# Patient Record
Sex: MALE | Race: WHITE | NOT HISPANIC OR LATINO
[De-identification: names, ages, dates, MRNs, and addresses within clinical notes are randomized per-mention and may not be internally consistent; named-entity substitution may affect disease eponyms.]

---

## 2022-03-23 PROBLEM — Z00.00 ENCOUNTER FOR PREVENTIVE HEALTH EXAMINATION: Status: ACTIVE | Noted: 2022-03-23

## 2022-07-12 ENCOUNTER — APPOINTMENT (OUTPATIENT)
Dept: NEPHROLOGY | Facility: CLINIC | Age: 72
End: 2022-07-12

## 2022-07-21 ENCOUNTER — APPOINTMENT (OUTPATIENT)
Dept: NEPHROLOGY | Facility: CLINIC | Age: 72
End: 2022-07-21

## 2022-07-21 VITALS
TEMPERATURE: 97.8 F | BODY MASS INDEX: 26.08 KG/M2 | HEART RATE: 68 BPM | SYSTOLIC BLOOD PRESSURE: 122 MMHG | DIASTOLIC BLOOD PRESSURE: 68 MMHG | OXYGEN SATURATION: 97 % | HEIGHT: 70 IN | WEIGHT: 182.2 LBS | RESPIRATION RATE: 18 BRPM

## 2022-07-21 DIAGNOSIS — N18.9 CHRONIC KIDNEY DISEASE, UNSPECIFIED: ICD-10-CM

## 2022-07-21 DIAGNOSIS — D63.1 CHRONIC KIDNEY DISEASE, UNSPECIFIED: ICD-10-CM

## 2022-07-21 DIAGNOSIS — H40.9 UNSPECIFIED GLAUCOMA: ICD-10-CM

## 2022-07-21 DIAGNOSIS — R09.89 OTHER SPECIFIED SYMPTOMS AND SIGNS INVOLVING THE CIRCULATORY AND RESPIRATORY SYSTEMS: ICD-10-CM

## 2022-07-21 DIAGNOSIS — E78.5 HYPERLIPIDEMIA, UNSPECIFIED: ICD-10-CM

## 2022-07-21 DIAGNOSIS — Z87.891 PERSONAL HISTORY OF NICOTINE DEPENDENCE: ICD-10-CM

## 2022-07-21 DIAGNOSIS — N25.81 SECONDARY HYPERPARATHYROIDISM OF RENAL ORIGIN: ICD-10-CM

## 2022-07-21 DIAGNOSIS — Z80.7 FAMILY HISTORY OF OTHER MALIGNANT NEOPLASMS OF LYMPHOID, HEMATOPOIETIC AND RELATED TISSUES: ICD-10-CM

## 2022-07-21 DIAGNOSIS — Z82.49 FAMILY HISTORY OF ISCHEMIC HEART DISEASE AND OTHER DISEASES OF THE CIRCULATORY SYSTEM: ICD-10-CM

## 2022-07-21 DIAGNOSIS — H35.30 UNSPECIFIED MACULAR DEGENERATION: ICD-10-CM

## 2022-07-21 DIAGNOSIS — I77.819 AORTIC ECTASIA, UNSPECIFIED SITE: ICD-10-CM

## 2022-07-21 DIAGNOSIS — I42.9 CARDIOMYOPATHY, UNSPECIFIED: ICD-10-CM

## 2022-07-21 DIAGNOSIS — K21.9 GASTRO-ESOPHAGEAL REFLUX DISEASE W/OUT ESOPHAGITIS: ICD-10-CM

## 2022-07-21 DIAGNOSIS — J45.909 UNSPECIFIED ASTHMA, UNCOMPLICATED: ICD-10-CM

## 2022-07-21 DIAGNOSIS — I20.9 ANGINA PECTORIS, UNSPECIFIED: ICD-10-CM

## 2022-07-21 DIAGNOSIS — Z85.72 PERSONAL HISTORY OF NON-HODGKIN LYMPHOMAS: ICD-10-CM

## 2022-07-21 DIAGNOSIS — Z80.0 FAMILY HISTORY OF MALIGNANT NEOPLASM OF DIGESTIVE ORGANS: ICD-10-CM

## 2022-07-21 DIAGNOSIS — Z87.438 PERSONAL HISTORY OF OTHER DISEASES OF MALE GENITAL ORGANS: ICD-10-CM

## 2022-07-21 DIAGNOSIS — M10.9 GOUT, UNSPECIFIED: ICD-10-CM

## 2022-07-21 DIAGNOSIS — G43.909 MIGRAINE, UNSPECIFIED, NOT INTRACTABLE, W/OUT STATUS MIGRAINOSUS: ICD-10-CM

## 2022-07-21 DIAGNOSIS — J44.9 CHRONIC OBSTRUCTIVE PULMONARY DISEASE, UNSPECIFIED: ICD-10-CM

## 2022-07-21 PROCEDURE — 99214 OFFICE O/P EST MOD 30 MIN: CPT

## 2022-07-21 RX ORDER — MONTELUKAST 10 MG/1
10 TABLET, FILM COATED ORAL DAILY
Refills: 0 | Status: ACTIVE | COMMUNITY
Start: 2022-05-19

## 2022-07-21 RX ORDER — UREA 40 G/100G
40 CREAM TOPICAL TWICE DAILY
Refills: 0 | Status: ACTIVE | COMMUNITY

## 2022-07-21 RX ORDER — ALCLOMETASONE DIPROPIONATE 0.5 MG/G
0.05 CREAM TOPICAL TWICE DAILY
Refills: 0 | Status: ACTIVE | COMMUNITY

## 2022-07-21 RX ORDER — PANTOPRAZOLE 40 MG/1
40 TABLET, DELAYED RELEASE ORAL DAILY
Refills: 0 | Status: ACTIVE | COMMUNITY
Start: 2022-06-02

## 2022-07-21 RX ORDER — LOSARTAN POTASSIUM 25 MG/1
25 TABLET, FILM COATED ORAL DAILY
Refills: 0 | Status: ACTIVE | COMMUNITY

## 2022-07-21 RX ORDER — BIMATOPROST 0.1 MG/ML
0.01 SOLUTION/ DROPS OPHTHALMIC DAILY
Refills: 0 | Status: ACTIVE | COMMUNITY
Start: 2022-06-06

## 2022-07-21 RX ORDER — FAMOTIDINE 20 MG/1
20 TABLET, FILM COATED ORAL TWICE DAILY
Refills: 0 | Status: ACTIVE | COMMUNITY
Start: 2022-04-17

## 2022-07-21 RX ORDER — UBIDECARENONE/VIT E ACET 100MG-5
50 MCG CAPSULE ORAL
Refills: 0 | Status: ACTIVE | COMMUNITY

## 2022-07-21 RX ORDER — LEVALBUTEROL TARTRATE 45 UG/1
45 AEROSOL, METERED ORAL EVERY 6 HOURS
Refills: 0 | Status: ACTIVE | COMMUNITY

## 2022-07-21 RX ORDER — LORATADINE 10 MG/1
10 TABLET ORAL
Refills: 0 | Status: ACTIVE | COMMUNITY

## 2022-07-21 RX ORDER — LOTEPREDNOL ETABONATE AND TOBRAMYCIN 5; 3 MG/ML; MG/ML
0.5-0.3 SUSPENSION/ DROPS OPHTHALMIC
Refills: 0 | Status: ACTIVE | COMMUNITY

## 2022-07-21 RX ORDER — ALLOPURINOL 100 MG/1
100 TABLET ORAL DAILY
Refills: 0 | Status: ACTIVE | COMMUNITY
Start: 2022-06-27

## 2022-07-21 RX ORDER — CARVEDILOL 12.5 MG/1
12.5 TABLET, FILM COATED ORAL TWICE DAILY
Refills: 0 | Status: ACTIVE | COMMUNITY
Start: 2022-05-19

## 2022-07-21 RX ORDER — VIT A/VIT C/VIT E/ZINC/COPPER 4296-226
CAPSULE ORAL
Refills: 0 | Status: ACTIVE | COMMUNITY

## 2022-07-21 RX ORDER — ASPIRIN 81 MG/1
81 TABLET, CHEWABLE ORAL DAILY
Refills: 0 | Status: ACTIVE | COMMUNITY

## 2022-07-21 RX ORDER — AMLODIPINE BESYLATE 2.5 MG/1
2.5 TABLET ORAL DAILY
Refills: 0 | Status: ACTIVE | COMMUNITY

## 2022-07-21 RX ORDER — ATORVASTATIN CALCIUM 20 MG/1
20 TABLET, FILM COATED ORAL DAILY
Refills: 0 | Status: ACTIVE | COMMUNITY
Start: 2022-05-19

## 2022-07-21 NOTE — ASSESSMENT
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  He has a history of left hydronephrosis due to a ureteral 'kink' which was stented and then repaired by Dr. Warren Bromberg (2018).  \par \par Dr. Hudson has has episodes of orthostatic hypotension in the past which appear to have improved followed a lowering of the amlodipine dose.\par \par RENAL ULTRASOUND (06/10/2018) R. 10.1 cm, L. 14.2 cm.  Right kidney is echogenic with cortical thinning and multiple cysts. Persistent severe left sided hydronephrosis.\par \par RENAL ULTRASOUND (12/05/2018) Dysplastic and multicystic right kidney, moderate left hydronephrosis- both stable compared with CT scan from August 2013\par \par \par (1) Stage III CKD.  The BUN/creatinine trend is as follows:  29/1.7 (03/2021), 26/1.5 (12/02/2021), 34/1.5 (03/10/2022).  The urinalysis of 06/08/2022  demonstrated trace protein and no blood.  The last urine protein to creatinine ratio was 235 mg per gram and urine albumin to creatinine ratio of 84.9 mcg per gram.  This  is a non proteinuric renal disease.  Dr. Reyes is suspicious for a contribution from the prior obstruction (chronic changes in one kidney, hydronephrosis in the other) and a possible cardiorenal component.  Dr. Hudson reports having an EF of around 50%.  Common contributors to non proteinuric renal disease include hypertension and atherosclerotic disease.   I suspect the losartan is a contributor.\par \par -Repeat the lab studies below prior to follow up in 3 months\par \par (2) Hypertension.  Excellent control.\par \par -Continue current regimen\par \par (3) Hyperkalemia\par \par -Try to get high K+ foods out of the diet (potatoes, tomatoes, avocados, melons, oranges, bananas)\par -I explained how to cook low potassium potatoes\par

## 2022-07-21 NOTE — PHYSICAL EXAM
[General Appearance - Alert] : alert [General Appearance - In No Acute Distress] : in no acute distress [Sclera] : the sclera and conjunctiva were normal [Extraocular Movements] : extraocular movements were intact [Neck Appearance] : the appearance of the neck was normal [Respiration, Rhythm And Depth] : normal respiratory rhythm and effort [Exaggerated Use Of Accessory Muscles For Inspiration] : no accessory muscle use [Auscultation Breath Sounds / Voice Sounds] : lungs were clear to auscultation bilaterally [Heart Rate And Rhythm] : heart rate was normal and rhythm regular [Heart Sounds] : normal S1 and S2 [Heart Sounds Gallop] : no gallops [Murmurs] : no murmurs [Heart Sounds Pericardial Friction Rub] : no pericardial rub [Full Pulse] : the pedal pulses are present [Edema] : there was no peripheral edema [FreeTextEntry1] : bilateral radial pulses intact [Bowel Sounds] : normal bowel sounds [Abdomen Soft] : soft [Abdomen Tenderness] : non-tender [Abnormal Walk] : normal gait [Nail Clubbing] : no clubbing  or cyanosis of the fingernails [Involuntary Movements] : no involuntary movements were seen [Skin Color & Pigmentation] : normal skin color and pigmentation [] : no rash [No Focal Deficits] : no focal deficits [Oriented To Time, Place, And Person] : oriented to person, place, and time [Impaired Insight] : insight and judgment were intact [Affect] : the affect was normal [Mood] : the mood was normal

## 2022-07-21 NOTE — REVIEW OF SYSTEMS
[Feeling Tired] : feeling tired [Loss Of Hearing] : hearing loss [Hesitancy] : no urinary hesitancy [Negative] : Heme/Lymph [FreeTextEntry2] : Lost 25 pounds (since start of pandemic) on Weight Watcher's, recently regained 5 pounds [FreeTextEntry3] : macular degeneration, normal pressure glaucoma [FreeTextEntry8] : nocturia x 1-2, good urinary stream, appears "a little foamy" [de-identified] : balancing issues- went to balance therapy, has grab bars in the shower, "If I turn fast.....tendency to almost fall back", fell once in 2021- due to orthostatic hypotension.  Also has had vertigo in the past.

## 2022-07-21 NOTE — HISTORY OF PRESENT ILLNESS
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  His past medical history is significant for labile hypertension, hyperlipidemia, COPD (ex tobacco use), cardiac ablations for SVT +/- atrial fibrillation (most recently in 2007), CAD s/p MI (2017) without any intervention, and 'mild' anginal symptoms and a remote history of B-cell lymphoma s/p orchiectomy and chemotherapy (2008).  He has a history of left hydronephrosis due to a ureteral 'kink' which was stented and then repaired by Dr. Warren Bromberg (2018). He reportedly has stage III chronic kidney disease with albuminuria.  The rest of his medical history is outlined below. Mr. Hudson was followed by Dr. Jr Reyes until he left the area.  \par \par Dr. Hudson reports he is well. He feels "a little worn down since the pandemic".  He has not been walking much since the pandemic. 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

## 2022-07-21 NOTE — CONSULT LETTER
[Dear  ___] : Dear  [unfilled], [Consult Letter:] : I had the pleasure of evaluating your patient, [unfilled]. [Please see my note below.] : Please see my note below. [Consult Closing:] : Thank you very much for allowing me to participate in the care of this patient.  If you have any questions, please do not hesitate to contact me. [Sincerely,] : Sincerely, [FreeTextEntry3] : Cornel [DrGena  ___] : Dr. DAVIS [DrGena ___] : Dr. DAVIS

## 2023-01-17 ENCOUNTER — APPOINTMENT (OUTPATIENT)
Dept: NEPHROLOGY | Facility: CLINIC | Age: 73
End: 2023-01-17
Payer: COMMERCIAL

## 2023-01-17 VITALS
SYSTOLIC BLOOD PRESSURE: 124 MMHG | HEART RATE: 76 BPM | RESPIRATION RATE: 18 BRPM | TEMPERATURE: 97.6 F | DIASTOLIC BLOOD PRESSURE: 68 MMHG | WEIGHT: 187 LBS | HEIGHT: 70 IN | OXYGEN SATURATION: 94 % | BODY MASS INDEX: 26.77 KG/M2

## 2023-01-17 PROCEDURE — 99214 OFFICE O/P EST MOD 30 MIN: CPT

## 2023-01-17 RX ORDER — UBROGEPANT 100 MG/1
100 TABLET ORAL
Qty: 10 | Refills: 0 | Status: ACTIVE | COMMUNITY
Start: 2022-11-15

## 2023-01-17 RX ORDER — AZELASTINE HYDROCHLORIDE 137 UG/1
137 SPRAY, METERED NASAL TWICE DAILY
Refills: 0 | Status: DISCONTINUED | COMMUNITY
End: 2023-01-17

## 2023-01-17 RX ORDER — FLUTICASONE FUROATE, UMECLIDINIUM BROMIDE AND VILANTEROL TRIFENATATE 200; 62.5; 25 UG/1; UG/1; UG/1
200-62.5-25 POWDER RESPIRATORY (INHALATION)
Qty: 180 | Refills: 0 | Status: ACTIVE | COMMUNITY
Start: 2023-01-11

## 2023-01-17 RX ORDER — FLUTICASONE PROPIONATE AND SALMETEROL XINAFOATE 115; 21 UG/1; UG/1
115-21 AEROSOL, METERED RESPIRATORY (INHALATION)
Refills: 0 | Status: DISCONTINUED | COMMUNITY
Start: 2022-07-07 | End: 2023-01-17

## 2023-01-17 NOTE — CONSULT LETTER
[Dear  ___] : Dear  [unfilled], [Consult Letter:] : I had the pleasure of evaluating your patient, [unfilled]. [Please see my note below.] : Please see my note below. [Consult Closing:] : Thank you very much for allowing me to participate in the care of this patient.  If you have any questions, please do not hesitate to contact me. [Sincerely,] : Sincerely, [DrGena  ___] : Dr. DAVIS [DrGena ___] : Dr. DAVIS [FreeTextEntry3] : Cornel

## 2023-01-17 NOTE — ASSESSMENT
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  He has a history of left hydronephrosis due to a ureteral 'kink' which was stented and then repaired by Dr. Warren Bromberg (2018).  \par \par Dr. Hudson has has episodes of orthostatic hypotension in the past which appear to have improved followed a lowering of the amlodipine dose.\par \par RENAL ULTRASOUND (06/10/2018) R. 10.1 cm, L. 14.2 cm.  Right kidney is echogenic with cortical thinning and multiple cysts. Persistent severe left sided hydronephrosis.\par \par RENAL ULTRASOUND (12/05/2018) Dysplastic and multicystic right kidney, moderate left hydronephrosis- both stable compared with CT scan from August 2013\par \par \par (1) Stage III CKD.  The BUN/creatinine trend is as follows:  29/1.7 (03/2021), 26/1.5 (12/02/2021), 34/1.5 (03/10/2022), 30/1.4 (12/13/2022).  The urinalysis of 12/13/2022  demonstrated neither protein nor blood.  The recent albumin/creatinine ratio was 183.3 mg per gram.  The prior  urine protein to creatinine ratio was 235 mg per gram with a urine albumin to creatinine ratio of 84.9 mcg per gram.  This  is a non proteinuric renal disease.  Dr. Reyes was suspicious for a contribution from the prior obstruction (chronic changes in one kidney, hydronephrosis in the other) and a possible cardiorenal component.  Dr. Hudson reports having an EF of around 50%.  Common contributors to non proteinuric renal disease include hypertension and atherosclerotic disease.   I suspect the losartan is a contributor to the mildly elevated serum creatinine. \par \par (2) Hypertension.  Excellent control.\par \par -Continue current regimen\par \par (3) Hyperkalemia. Resolved.  The recent serum K+ was 5.1 meq per liter on a low K+ diet. \par \par (4) New diagnosis of DM (HbA1c 6.65)\par \par RECOMMENDATIONS:\par \par (1) Continue losartan for renal protection.  Ideally I would increase the dose to 50 mg daily though hesitate to given the prior hyperkalemia.  Continue the low K+ diet.  If the serum K+ remains normal, consider a trial of losartan 50 mg daily.\par \par (2) DM. We talked about cutting back on simple carbohydrates and weight loss. \par \par If all is stable, I will see Dr. Hudson back in 1 year with a CMP, urinalysis, random urine albumin/creatinine ratio. \par

## 2023-01-17 NOTE — PHYSICAL EXAM
[General Appearance - Alert] : alert [General Appearance - In No Acute Distress] : in no acute distress [Sclera] : the sclera and conjunctiva were normal [Extraocular Movements] : extraocular movements were intact [Neck Appearance] : the appearance of the neck was normal [Respiration, Rhythm And Depth] : normal respiratory rhythm and effort [Exaggerated Use Of Accessory Muscles For Inspiration] : no accessory muscle use [Auscultation Breath Sounds / Voice Sounds] : lungs were clear to auscultation bilaterally [Heart Rate And Rhythm] : heart rate was normal and rhythm regular [Heart Sounds] : normal S1 and S2 [Heart Sounds Gallop] : no gallops [Murmurs] : no murmurs [Heart Sounds Pericardial Friction Rub] : no pericardial rub [Full Pulse] : the pedal pulses are present [Edema] : there was no peripheral edema [Bowel Sounds] : normal bowel sounds [Abdomen Soft] : soft [Abdomen Tenderness] : non-tender [Abnormal Walk] : normal gait [Involuntary Movements] : no involuntary movements were seen [Skin Color & Pigmentation] : normal skin color and pigmentation [] : no rash [No Focal Deficits] : no focal deficits [Oriented To Time, Place, And Person] : oriented to person, place, and time [Impaired Insight] : insight and judgment were intact [Affect] : the affect was normal [Mood] : the mood was normal [FreeTextEntry1] : bilateral radial pulses intact

## 2023-01-17 NOTE — REVIEW OF SYSTEMS
[Feeling Tired] : feeling tired [Loss Of Hearing] : hearing loss [Negative] : Heme/Lymph [Hesitancy] : no urinary hesitancy [FreeTextEntry2] : Lost around 20 pounds on Weight Watcher's and gained 6 pounds over the past 6 months [FreeTextEntry3] : macular degeneration, normal pressure glaucoma [FreeTextEntry8] : nocturia x 0-2, good urinary stream, appears "a little foamy" [de-identified] : balancing issues- went to balance therapy, no grab bars in the shower, "If I turn fast.....tendency to almost fall back"- this "seems less".  Fell once in 2021- due to orthostatic hypotension.  Also has had vertigo in the past.  None recently

## 2023-01-17 NOTE — HISTORY OF PRESENT ILLNESS
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  His past medical history is significant for labile hypertension, hyperlipidemia, COPD (ex tobacco use), cardiac ablations for SVT +/- atrial fibrillation (most recently in 2007), CAD s/p MI (2017) without any intervention, and 'mild' anginal symptoms and a remote history of B-cell lymphoma s/p orchiectomy and chemotherapy (2008).  He has a history of left hydronephrosis due to a ureteral 'kink' which was stented and then repaired by Dr. Warren Bromberg (2018). He reportedly has stage III chronic kidney disease with albuminuria.  The rest of his medical history is outlined below. Mr. Hudson was followed by Dr. Jr Reyes until he left the area.  \par \par Dr. Hudson is here for follow up.  He had a "long respiratory illness" from September to December 2022 complicated by a COPD exacerbation.  He is currently feeling well.  He had an MRI of his brain reported as 'unremarkable'.  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

## 2024-01-22 ENCOUNTER — APPOINTMENT (OUTPATIENT)
Dept: NEPHROLOGY | Facility: CLINIC | Age: 74
End: 2024-01-22
Payer: COMMERCIAL

## 2024-01-22 VITALS
WEIGHT: 177 LBS | HEART RATE: 60 BPM | RESPIRATION RATE: 18 BRPM | SYSTOLIC BLOOD PRESSURE: 118 MMHG | BODY MASS INDEX: 25.34 KG/M2 | OXYGEN SATURATION: 94 % | TEMPERATURE: 97.7 F | DIASTOLIC BLOOD PRESSURE: 70 MMHG | HEIGHT: 70 IN

## 2024-01-22 DIAGNOSIS — N18.30 CHRONIC KIDNEY DISEASE, STAGE 3 UNSPECIFIED: ICD-10-CM

## 2024-01-22 DIAGNOSIS — R80.9 PROTEINURIA, UNSPECIFIED: ICD-10-CM

## 2024-01-22 PROCEDURE — 99214 OFFICE O/P EST MOD 30 MIN: CPT

## 2024-01-22 RX ORDER — AFLIBERCEPT 40 MG/ML
2 INJECTION, SOLUTION INTRAVITREAL
Refills: 0 | Status: DISCONTINUED | COMMUNITY
End: 2024-01-22

## 2024-01-22 RX ORDER — SERTRALINE HYDROCHLORIDE 100 MG/1
100 TABLET, FILM COATED ORAL DAILY
Qty: 90 | Refills: 0 | Status: ACTIVE | COMMUNITY

## 2024-01-22 NOTE — REVIEW OF SYSTEMS
[Feeling Tired] : feeling tired [Loss Of Hearing] : hearing loss [Hesitancy] : no urinary hesitancy [Negative] : Heme/Lymph [FreeTextEntry2] : weight stable at 177 pounds.  See above.  [FreeTextEntry3] : macular degeneration, normal pressure glaucoma [FreeTextEntry8] : nocturia x 0-1, good urinary stream [de-identified] : balancing issues- went to balance therapy, no grab bars in the shower, "If I turn fast.....tendency to almost fall back"- this "seems less".  Fell once in 2021- due to orthostatic hypotension.  Also has had vertigo in the past.  None recently

## 2024-01-22 NOTE — ASSESSMENT
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 He has a history of left hydronephrosis due to a ureteral 'kink' which was stented and then repaired by Dr. Warren Bromberg (2018).  Dr. Hudson has had episodes of orthostatic hypotension in the past which appear to have improved followed a lowering of the amlodipine dose.  DrGena Hudson has had a difficult year.  He is starting to feel better (see introductory paragraph).  The suicidal ideations have resolved.   RENAL ULTRASOUND (06/10/2018) R. 10.1 cm, L. 14.2 cm. Right kidney is echogenic with cortical thinning and multiple cysts. Persistent severe left sided hydronephrosis.  RENAL ULTRASOUND (12/05/2018) Dysplastic and multicystic right kidney, moderate left hydronephrosis- both stable compared with CT scan from August 2013  (1) Stage II-III CKD. The BUN/creatinine trend is as follows: 29/1.7 (03/2021), 26/1.5 (12/02/2021), 34/1.5 (03/10/2022), 30/1.4 (12/13/2022), 28/1.4 (12/08/2023).. The urinalysis of 12/13/2022 demonstrated neither protein nor blood. The last albumin/creatinine ratio was 183.3 mg per gram. I don't have a recent one.  This is a non proteinuric renal disease. Dr. Reyes was suspicious for a contribution from the prior obstruction (chronic changes in one kidney, hydronephrosis in the other) and a possible cardiorenal component. Dr. Hudson reports having an EF of around 50%. Common contributors to non proteinuric renal disease include hypertension and atherosclerotic disease. I suspect the losartan is a contributor to the mildly elevated serum creatinine.  (2) Hypertension. Excellent control. -Continue current regimen  (3) Hyperkalemia. Resolved. The recent serum K+ was 4.7 meq per liter.   (4) Diabetes mellitus.  The HbA1c improved from 6.5 --> 5.5% with discontinuation of steroids.   RECOMMENDATIONS:  (1) Continue losartan for renal protection. Ideally, I would increase the dose to 50 mg daily though hesitate to given the prior hyperkalemia. Continue the low K+ diet.  (2) Please check a random urine albumin to creatinine ratio yearly.   (3) Ideally, avoid the use of NSAIDS.    If all is stable, I will see Dr. Hudson back in 1 year with a CMP, urinalysis, random urine albumin/creatinine ratio.            Electronically signed by : CHELE RODRIGUEZ MD; Jan 17 2023  1:38PM EST (Author)

## 2024-01-22 NOTE — HISTORY OF PRESENT ILLNESS
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  His past medical history is significant for labile hypertension, hyperlipidemia, COPD (ex tobacco use), cardiac ablations for SVT +/- atrial fibrillation (most recently in 2007), CAD s/p MI (2017) without any intervention, and 'mild' anginal symptoms and a remote history of B-cell lymphoma s/p orchiectomy and chemotherapy (2008).  He has a history of left hydronephrosis due to a ureteral 'kink' which was stented and then repaired by Dr. Warren Bromberg (2018). He reportedly has stage III chronic kidney disease with albuminuria.  The rest of his medical history is outlined below. Mr. Hudson was followed by Dr. Jr Reyes until he left the area.    Dr. Hudson is here for follow up.  He developed COVID in 2021.  At the time of our last meeting in January 2023, he was being treated with prednisone for a prolonged respiratory illness (September to December 2022) complicated by a COPD exacerbation.  Following our meeting of January 2023 he "went downhill" with symptoms including lethargy, fatigue, post exertional malaise, cognitive decline, depression, anxiety, suicidal ideations- these "peaked in September (2023)".  He has since been started on sertraline.  "Most of the anxiety and depression symptoms have resolved.  The cognitive symptoms are starting to resolve a bit".   He is working with "2 long-COVID doctors".   Dr. Hudson states that "kidneys seem to be my strong point".    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

## 2024-01-22 NOTE — PHYSICAL EXAM
[General Appearance - Alert] : alert [General Appearance - In No Acute Distress] : in no acute distress [Sclera] : the sclera and conjunctiva were normal [Extraocular Movements] : extraocular movements were intact [Neck Appearance] : the appearance of the neck was normal [] : no respiratory distress [Respiration, Rhythm And Depth] : normal respiratory rhythm and effort [Exaggerated Use Of Accessory Muscles For Inspiration] : no accessory muscle use [Auscultation Breath Sounds / Voice Sounds] : lungs were clear to auscultation bilaterally [Heart Rate And Rhythm] : heart rate was normal and rhythm regular [Heart Sounds] : normal S1 and S2 [Heart Sounds Gallop] : no gallops [Murmurs] : no murmurs [Heart Sounds Pericardial Friction Rub] : no pericardial rub [Edema] : there was no peripheral edema [Bowel Sounds] : normal bowel sounds [Abdomen Soft] : soft [Abdomen Tenderness] : non-tender [Abnormal Walk] : normal gait [Involuntary Movements] : no involuntary movements were seen [Skin Color & Pigmentation] : normal skin color and pigmentation [No Focal Deficits] : no focal deficits [Oriented To Time, Place, And Person] : oriented to person, place, and time [Impaired Insight] : insight and judgment were intact [Affect] : the affect was normal [Mood] : the mood was normal

## 2025-01-10 DIAGNOSIS — E83.42 HYPOMAGNESEMIA: ICD-10-CM

## 2025-03-05 ENCOUNTER — APPOINTMENT (OUTPATIENT)
Dept: NEPHROLOGY | Facility: CLINIC | Age: 75
End: 2025-03-05
Payer: MEDICARE

## 2025-03-05 VITALS
WEIGHT: 172 LBS | DIASTOLIC BLOOD PRESSURE: 64 MMHG | SYSTOLIC BLOOD PRESSURE: 112 MMHG | OXYGEN SATURATION: 93 % | BODY MASS INDEX: 24.62 KG/M2 | RESPIRATION RATE: 18 BRPM | HEIGHT: 70 IN | HEART RATE: 66 BPM | TEMPERATURE: 98 F

## 2025-03-05 DIAGNOSIS — N18.30 CHRONIC KIDNEY DISEASE, STAGE 3 UNSPECIFIED: ICD-10-CM

## 2025-03-05 DIAGNOSIS — I10 ESSENTIAL (PRIMARY) HYPERTENSION: ICD-10-CM

## 2025-03-05 DIAGNOSIS — R80.9 PROTEINURIA, UNSPECIFIED: ICD-10-CM

## 2025-03-05 PROCEDURE — 99214 OFFICE O/P EST MOD 30 MIN: CPT

## 2025-03-05 RX ORDER — RIMEGEPANT SULFATE 75 MG/75MG
75 TABLET, ORALLY DISINTEGRATING ORAL
Refills: 0 | Status: ACTIVE | COMMUNITY

## 2025-03-05 RX ORDER — DULOXETINE HYDROCHLORIDE 30 MG/1
30 CAPSULE, DELAYED RELEASE ORAL
Refills: 0 | Status: ACTIVE | COMMUNITY

## 2025-03-10 ENCOUNTER — APPOINTMENT (OUTPATIENT)
Dept: THORACIC SURGERY | Facility: CLINIC | Age: 75
End: 2025-03-10

## 2025-09-08 ENCOUNTER — NON-APPOINTMENT (OUTPATIENT)
Age: 75
End: 2025-09-08

## 2025-09-09 ENCOUNTER — APPOINTMENT (OUTPATIENT)
Dept: NEPHROLOGY | Facility: CLINIC | Age: 75
End: 2025-09-09
Payer: MEDICARE

## 2025-09-09 VITALS
WEIGHT: 177 LBS | BODY MASS INDEX: 25.34 KG/M2 | RESPIRATION RATE: 18 BRPM | HEIGHT: 70 IN | HEART RATE: 64 BPM | TEMPERATURE: 97.4 F | DIASTOLIC BLOOD PRESSURE: 72 MMHG | OXYGEN SATURATION: 95 % | SYSTOLIC BLOOD PRESSURE: 116 MMHG

## 2025-09-09 DIAGNOSIS — I10 ESSENTIAL (PRIMARY) HYPERTENSION: ICD-10-CM

## 2025-09-09 DIAGNOSIS — D58.2 OTHER HEMOGLOBINOPATHIES: ICD-10-CM

## 2025-09-09 DIAGNOSIS — Z13.31 ENCOUNTER FOR SCREENING FOR DEPRESSION: ICD-10-CM

## 2025-09-09 DIAGNOSIS — N18.30 CHRONIC KIDNEY DISEASE, STAGE 3 UNSPECIFIED: ICD-10-CM

## 2025-09-09 DIAGNOSIS — E87.5 HYPERKALEMIA: ICD-10-CM

## 2025-09-09 DIAGNOSIS — R80.9 PROTEINURIA, UNSPECIFIED: ICD-10-CM

## 2025-09-09 PROCEDURE — 99214 OFFICE O/P EST MOD 30 MIN: CPT

## 2025-09-09 RX ORDER — VITAMIN B COMPLEX
TABLET ORAL DAILY
Refills: 0 | Status: ACTIVE | COMMUNITY

## 2025-09-09 RX ORDER — UBIDECARENONE 200 MG
200 CAPSULE ORAL
Refills: 0 | Status: ACTIVE | COMMUNITY

## 2025-09-09 RX ORDER — LORATADINE 5 MG
17 TABLET,CHEWABLE ORAL
Refills: 0 | Status: ACTIVE | COMMUNITY

## 2025-09-09 RX ORDER — FLUTICASONE PROPIONATE 50 MCG
50 SPRAY, SUSPENSION NASAL
Refills: 0 | Status: ACTIVE | COMMUNITY

## 2025-09-09 RX ORDER — SAW/PYGEUM/BETA/HERB/D3/B6/ZN 30 MG-25MG
CAPSULE ORAL TWICE DAILY
Refills: 0 | Status: ACTIVE | COMMUNITY

## 2025-09-09 RX ORDER — ACETYLCYSTEINE 600 MG
600 CAPSULE ORAL
Refills: 0 | Status: ACTIVE | COMMUNITY